# Patient Record
Sex: MALE | Race: BLACK OR AFRICAN AMERICAN | NOT HISPANIC OR LATINO | URBAN - METROPOLITAN AREA
[De-identification: names, ages, dates, MRNs, and addresses within clinical notes are randomized per-mention and may not be internally consistent; named-entity substitution may affect disease eponyms.]

---

## 2021-05-05 ENCOUNTER — EMERGENCY (EMERGENCY)
Facility: HOSPITAL | Age: 71
LOS: 1 days | Discharge: HOME | End: 2021-05-05
Attending: EMERGENCY MEDICINE | Admitting: EMERGENCY MEDICINE
Payer: SELF-PAY

## 2021-05-05 VITALS
DIASTOLIC BLOOD PRESSURE: 83 MMHG | HEART RATE: 66 BPM | OXYGEN SATURATION: 99 % | RESPIRATION RATE: 18 BRPM | SYSTOLIC BLOOD PRESSURE: 168 MMHG

## 2021-05-05 VITALS
WEIGHT: 179.9 LBS | OXYGEN SATURATION: 99 % | HEART RATE: 68 BPM | TEMPERATURE: 98 F | DIASTOLIC BLOOD PRESSURE: 91 MMHG | RESPIRATION RATE: 18 BRPM | SYSTOLIC BLOOD PRESSURE: 199 MMHG
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
## 2021-05-05 DIAGNOSIS — M25.521 PAIN IN RIGHT ELBOW: ICD-10-CM

## 2021-05-05 DIAGNOSIS — S50.01XA CONTUSION OF RIGHT ELBOW, INITIAL ENCOUNTER: ICD-10-CM
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
## 2021-05-05 PROCEDURE — 99283 EMERGENCY DEPT VISIT LOW MDM: CPT

## 2021-05-05 RX ORDER — IBUPROFEN 200 MG
600 TABLET ORAL ONCE
Refills: 0 | Status: COMPLETED | OUTPATIENT
Start: 2021-05-05 | End: 2021-05-05

## 2021-05-05 RX ORDER — ACETAMINOPHEN 500 MG
650 TABLET ORAL ONCE
Refills: 0 | Status: COMPLETED | OUTPATIENT
Start: 2021-05-05 | End: 2021-05-05

## 2021-05-05 RX ADMIN — Medication 600 MILLIGRAM(S): at 16:30

## 2021-05-05 RX ADMIN — Medication 650 MILLIGRAM(S): at 16:30
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
## 2021-05-05 NOTE — ED PROVIDER NOTE - PHYSICAL EXAMINATION
VITAL SIGNS: I have reviewed nursing notes and confirm.  CONSTITUTIONAL: Very happy, smiling, and cheering, Well-developed; well-nourished; in no acute distress.  SKIN: Skin is warm and dry, no acute rash.  HEAD: Normocephalic; atraumatic.  EYES: Pupils round bilaterally, 3mm; conjunctiva and sclera clear.  ENT: No nasal discharge; airway clear, MMM.  NECK: Supple; non tender.  MSL: Mild point tenderness to palpation to the right side over area of fat; no ecchymosis, skin is intact; elbow has full ROM, good pulses, skin intact, good strength.   EXT: Normal ROM. No clubbing, cyanosis or edema.  NEURO: Alert, oriented. Grossly unremarkable. STONE, normal tone, no gross motor or sensory changes. Fluent speech.   PSYCH: Cooperative, appropriate. Mood and affect wnl.
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
## 2021-05-05 NOTE — ED PROVIDER NOTE - NS ED ROS FT
Other than symptoms associated with present events the following is reported:  General:  No fever, no chills, no weight loss.  HEENT:  No sore throat.  Respiratory: No cough, no dyspnea, no wheeze.  MSK[+]: Right side soreness & right elbow pain  Cardiovascular:  No chest pain, no palpitations, no orthopnea.  Neurological:  No headache, no focal weakness.   Psychiatric: No emotional stress, no depression.  Derm:  No rash.  Heme:  No bruising, no bleeding.
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
## 2021-05-05 NOTE — ED PROVIDER NOTE - PATIENT PORTAL LINK FT
You can access the FollowMyHealth Patient Portal offered by [facility] by registering at the following website: [email or web address]. By joining [employer]’s FollowMyHealth portal, you will also be able to view your health information using other applications (apps) compatible with our system.

## 2021-05-05 NOTE — ED PROVIDER NOTE - NSFOLLOWUPINSTRUCTIONS_ED_ALL_ED_FT
Muscle Pain, Adult      Muscle pain, also called myalgia, is a condition in which a person has pain in one or more muscles in the body. Muscle pain may be mild, moderate, or severe. It may feel sharp, achy, or burning. In most cases, the pain lasts only a short time and goes away without treatment.    Muscle pain can result from using muscles in a new or different way or after a period of inactivity. It is normal to feel some muscle pain after starting an exercise program. Muscles that have not been used often will be sore at first.      What are the causes?  This condition is caused by using muscles in a new or different way after a period of inactivity. Other causes may include:  •Overuse or muscle strain, especially if you are not in shape. This is the most common cause of muscle pain.      •Injury or bruising.      •Infectious diseases, including diseases caused by viruses, such as the flu (influenza).      •Fibromyalgia.This is a long-term, or chronic, condition that causes muscle tenderness, tiredness (fatigue), and headache.      •Autoimmune or rheumatologic diseases. These are conditions, such as lupus, in which the body's defense system (immunesystem) attacks areas in the body.      •Certain medicines, including ACE inhibitors and statins.        What are the signs or symptoms?    The main symptom of this condition is sore or painful muscles, including during activity and when stretching. You may also have slight swelling.      How is this diagnosed?    This condition is diagnosed with a physical exam. Your health care provider will ask questions about your pain and when it began. If you have not had muscle pain for very long, your health care provider may want to wait before doing much testing. If your muscle pain has lasted a long time, tests may be done right away. In some cases, this may include tests to rule out certain conditions or illnesses.      How is this treated?    Treatment for this condition depends on the cause. Home care is often enough to relieve muscle pain. Your health care provider may also prescribe NSAIDs, such as ibuprofen.      Follow these instructions at home:    Medicines     •Take over-the-counter and prescription medicines only as told by your health care provider.      •Ask your health care provider if the medicine prescribed to you requires you to avoid driving or using machinery.        Managing pain, swelling, and discomfort                 •If directed, put ice on the painful area. To do this:  •Put ice in a plastic bag.      •Place a towel between your skin and the bag.      •Leave the ice on for 20 minutes, 2–3 times a day.      •For the first 2 days of muscle soreness, or if there is swelling:  •Do not soak in hot baths.      •Do not use a hot tub, steam room, sauna, heating pad, or other heat source.      •After 48–72 hours, you may alternate between applying ice and applying heat as told by your health care provider. If directed, apply heat to the affected area as often as told by your health care provider. Use the heat source that your health care provider recommends, such as a moist heat pack or a heating pad.  •Place a towel between your skin and the heat source.      •Leave the heat on for 20–30 minutes.      •Remove the heat if your skin turns bright red. This is especially important if you are unable to feel pain, heat, or cold. You may have a greater risk of getting burned.        •If you have an injury, raise (elevate) the injured area above the level of your heart while you are sitting or lying down.        Activity    •If overuse is causing your muscle pain:  •Slow down your activities until the pain goes away.      •Do regular, gentle exercises if you are not usually active.      •Warm up before exercising. Stretch before and after exercising. This can help lower the risk of muscle pain.        • Do not continue working out if the pain is severe. Severe pain could mean that you have injured a muscle.      • Do not lift anything that is heavier than 5–10 lb (2.3–4.5 kg), or the limit that you are told, until your health care provider says that it is safe.      •Return to your normal activities as told by your health care provider. Ask your health care provider what activities are safe for you.      General instructions     • Do not use any products that contain nicotine or tobacco, such as cigarettes, e-cigarettes, and chewing tobacco. These can delay healing. If you need help quitting, ask your health care provider.      •Keep all follow-up visits as told by your health care provider. This is important.        Contact a health care provider if you have:    •Muscle pain that gets worse and medicines do not help.      •Muscle pain that lasts longer than 3 days.      •A rash or fever along with muscle pain.      •Muscle pain after a tick bite.      •Muscle pain while working out, even though you are in good physical condition.      •Redness, soreness, or swelling along with muscle pain.      •Muscle pain after starting a new medicine or changing the dose of a medicine.        Get help right away if you have:    •Trouble breathing.      •Trouble swallowing.      •Muscle pain along with a stiff neck, fever, and vomiting.      •Severe muscle weakness or you cannot move part of your body.      These symptoms may represent a serious problem that is an emergency. Do not wait to see if the symptoms will go away. Get medical help right away. Call your local emergency services (911 in the [place] Do not drive yourself to the hospital.       Summary    •Muscle pain usually lasts only a short time and goes away without treatment.      •This condition is caused by using muscles in a new or different way after a period of inactivity.      •If your muscle pain lasts longer than 3 days, tell your health care provider.      This information is not intended to replace advice given to you by your health care provider. Make sure you discuss any questions you have with your health care provider.      Document Revised: 09/25/2020 Document Reviewed: 09/25/2020    [employer] Patient Education © 2021 [employer]

## 2021-05-05 NOTE — ED PROVIDER NOTE - DISPOSITION TYPE
DISCHARGE
Stay well hydrated.  Follow-up with your primary doctor within 1-2 days  Please follow-up with gastroenterologist if abdominal pain persists  Bring a copy of your results with you  Return to an ER for worsening symptoms or any other concerns.

## 2021-05-27 ENCOUNTER — EMERGENCY (EMERGENCY)
Facility: HOSPITAL | Age: 71
LOS: 1 days | Discharge: HOME | End: 2021-05-27
Attending: EMERGENCY MEDICINE | Admitting: EMERGENCY MEDICINE
Payer: COMMERCIAL

## 2021-05-27 VITALS
OXYGEN SATURATION: 97 % | RESPIRATION RATE: 16 BRPM | DIASTOLIC BLOOD PRESSURE: 81 MMHG | TEMPERATURE: 98 F | SYSTOLIC BLOOD PRESSURE: 168 MMHG | HEART RATE: 61 BPM

## 2021-05-27 VITALS
HEART RATE: 76 BPM | HEIGHT: 70 IN | OXYGEN SATURATION: 97 % | RESPIRATION RATE: 16 BRPM | WEIGHT: 177.03 LBS | TEMPERATURE: 99 F | DIASTOLIC BLOOD PRESSURE: 94 MMHG | SYSTOLIC BLOOD PRESSURE: 184 MMHG

## 2021-05-27 DIAGNOSIS — R31.9 HEMATURIA, UNSPECIFIED: ICD-10-CM

## 2021-05-27 LAB
ALBUMIN SERPL ELPH-MCNC: 3.8 G/DL — SIGNIFICANT CHANGE UP (ref 3.4–5)
ALP SERPL-CCNC: 95 U/L — SIGNIFICANT CHANGE UP (ref 40–120)
ALT FLD-CCNC: 24 U/L — SIGNIFICANT CHANGE UP (ref 12–42)
ANION GAP SERPL CALC-SCNC: 10 MMOL/L — SIGNIFICANT CHANGE UP (ref 9–16)
APPEARANCE UR: CLEAR — SIGNIFICANT CHANGE UP
AST SERPL-CCNC: 32 U/L — SIGNIFICANT CHANGE UP (ref 15–37)
BACTERIA # UR AUTO: PRESENT /HPF
BASOPHILS # BLD AUTO: 0 K/UL — SIGNIFICANT CHANGE UP (ref 0–0.2)
BILIRUB SERPL-MCNC: 0.9 MG/DL — SIGNIFICANT CHANGE UP (ref 0.2–1.2)
BILIRUB UR-MCNC: NEGATIVE — SIGNIFICANT CHANGE UP
BUN SERPL-MCNC: 10 MG/DL — SIGNIFICANT CHANGE UP (ref 7–23)
CALCIUM SERPL-MCNC: 9.2 MG/DL — SIGNIFICANT CHANGE UP (ref 8.5–10.5)
CHLORIDE SERPL-SCNC: 107 MMOL/L — SIGNIFICANT CHANGE UP (ref 96–108)
CO2 SERPL-SCNC: 27 MMOL/L — SIGNIFICANT CHANGE UP (ref 22–31)
COLOR SPEC: YELLOW — SIGNIFICANT CHANGE UP
CREAT SERPL-MCNC: 1 MG/DL — SIGNIFICANT CHANGE UP (ref 0.5–1.3)
DIFF PNL FLD: ABNORMAL
EOSINOPHIL # BLD AUTO: 0.06 K/UL — SIGNIFICANT CHANGE UP (ref 0–0.5)
EOSINOPHIL NFR BLD AUTO: 1.1 % — SIGNIFICANT CHANGE UP (ref 0–6)
EPI CELLS # UR: SIGNIFICANT CHANGE UP /HPF (ref 0–5)
GLUCOSE SERPL-MCNC: 101 MG/DL — HIGH (ref 70–99)
GLUCOSE UR QL: NEGATIVE — SIGNIFICANT CHANGE UP
HCT VFR BLD CALC: 39.5 % — SIGNIFICANT CHANGE UP (ref 39–50)
HGB BLD-MCNC: 12.6 G/DL — LOW (ref 13–17)
IMM GRANULOCYTES NFR BLD AUTO: 0.2 % — SIGNIFICANT CHANGE UP (ref 0–1.5)
KETONES UR-MCNC: NEGATIVE — SIGNIFICANT CHANGE UP
LEUKOCYTE ESTERASE UR-ACNC: ABNORMAL
LYMPHOCYTES # BLD AUTO: 1.34 K/UL — SIGNIFICANT CHANGE UP (ref 1–3.3)
LYMPHOCYTES # BLD AUTO: 24.9 % — SIGNIFICANT CHANGE UP (ref 13–44)
MCHC RBC-ENTMCNC: 29.2 PG — SIGNIFICANT CHANGE UP (ref 27–34)
MCHC RBC-ENTMCNC: 31.9 GM/DL — LOW (ref 32–36)
MCV RBC AUTO: 91.4 FL — SIGNIFICANT CHANGE UP (ref 80–100)
MONOCYTES # BLD AUTO: 0.57 K/UL — SIGNIFICANT CHANGE UP (ref 0–0.9)
MONOCYTES NFR BLD AUTO: 10.6 % — SIGNIFICANT CHANGE UP (ref 2–14)
NEUTROPHILS # BLD AUTO: 3.4 K/UL — SIGNIFICANT CHANGE UP (ref 1.8–7.4)
NEUTROPHILS NFR BLD AUTO: 63.2 % — SIGNIFICANT CHANGE UP (ref 43–77)
NITRITE UR-MCNC: NEGATIVE — SIGNIFICANT CHANGE UP
NRBC # BLD: 0 /100 WBCS — SIGNIFICANT CHANGE UP (ref 0–0)
PH UR: 6 — SIGNIFICANT CHANGE UP (ref 5–8)
PLATELET # BLD AUTO: 114 K/UL — LOW (ref 150–400)
POTASSIUM SERPL-MCNC: 3.8 MMOL/L — SIGNIFICANT CHANGE UP (ref 3.5–5.3)
POTASSIUM SERPL-SCNC: 3.8 MMOL/L — SIGNIFICANT CHANGE UP (ref 3.5–5.3)
PROT SERPL-MCNC: 7.3 G/DL — SIGNIFICANT CHANGE UP (ref 6.4–8.2)
PROT UR-MCNC: 100 MG/DL
RBC # BLD: 4.32 M/UL — SIGNIFICANT CHANGE UP (ref 4.2–5.8)
RBC # FLD: 11.6 % — SIGNIFICANT CHANGE UP (ref 10.3–14.5)
RBC CASTS # UR COMP ASSIST: > 10 /HPF
SODIUM SERPL-SCNC: 144 MMOL/L — SIGNIFICANT CHANGE UP (ref 132–145)
SP GR SPEC: 1.02 — SIGNIFICANT CHANGE UP (ref 1–1.03)
UROBILINOGEN FLD QL: 0.2 E.U./DL — SIGNIFICANT CHANGE UP
WBC # BLD: 5.38 K/UL — SIGNIFICANT CHANGE UP (ref 3.8–10.5)
WBC # FLD AUTO: 5.38 K/UL — SIGNIFICANT CHANGE UP (ref 3.8–10.5)
WBC UR QL: > 10 /HPF

## 2021-05-27 PROCEDURE — 74177 CT ABD & PELVIS W/CONTRAST: CPT | Mod: 26,MH

## 2021-05-27 PROCEDURE — 99285 EMERGENCY DEPT VISIT HI MDM: CPT

## 2021-05-27 RX ORDER — SODIUM CHLORIDE 9 MG/ML
1000 INJECTION INTRAMUSCULAR; INTRAVENOUS; SUBCUTANEOUS ONCE
Refills: 0 | Status: COMPLETED | OUTPATIENT
Start: 2021-05-27 | End: 2021-05-27

## 2021-05-27 RX ADMIN — SODIUM CHLORIDE 1000 MILLILITER(S): 9 INJECTION INTRAMUSCULAR; INTRAVENOUS; SUBCUTANEOUS at 21:23

## 2021-05-27 RX ADMIN — SODIUM CHLORIDE 1000 MILLILITER(S): 9 INJECTION INTRAMUSCULAR; INTRAVENOUS; SUBCUTANEOUS at 22:53

## 2021-05-27 NOTE — ED ADULT NURSE REASSESSMENT NOTE - NS ED NURSE REASSESS COMMENT FT1
Pt awaiting radiology results. Family at the bedside.
Received Pt from previous RN lying on stretcher awake and alert, breathing with ease on RA. IV site is patent. Family at the bedside. Urine specimen collected and sent. Awaiting dispo.
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
## 2021-05-28 NOTE — ED PROVIDER NOTE - CLINICAL SUMMARY MEDICAL DECISION MAKING FREE TEXT BOX
pt stable throughout ed stay   ct results dw the patient all questions answered     follow up with urology   levsharmin rx

## 2021-05-28 NOTE — ED PROVIDER NOTE - CARE PROVIDER_API CALL
James Ley)  Urology  [address] [place] [address]  Phone: [phone]  Fax: [phone]  Follow Up Time: 1-3 Days

## 2021-05-28 NOTE — ED PROVIDER NOTE - NSFOLLOWUPINSTRUCTIONS_ED_ALL_ED_FT
take medications as directed     follow up with urology this week    return to ER if blood in urine increases or for pain or fever
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
## 2021-05-28 NOTE — ED PROVIDER NOTE - PATIENT PORTAL LINK FT
You can access the FollowMyHealth Patient Portal offered by [facility] by registering at the following website: [email or web address]. By joining [employer]’s FollowMyHealth portal, you will also be able to view your health information using other applications (apps) compatible with our system.

## 2023-10-14 DIAGNOSIS — I89.0 LYMPHEDEMA: Primary | ICD-10-CM

## 2023-11-02 ENCOUNTER — APPOINTMENT (OUTPATIENT)
Dept: REHABILITATION | Age: 73
End: 2023-11-02
Attending: INTERNAL MEDICINE

## 2023-11-09 ENCOUNTER — HOSPITAL ENCOUNTER (OUTPATIENT)
Dept: REHABILITATION | Age: 73
Discharge: STILL ADMITTED OR ADMINISTRATIVE | End: 2023-11-09
Attending: INTERNAL MEDICINE

## 2023-11-09 PROCEDURE — 97165 OT EVAL LOW COMPLEX 30 MIN: CPT | Performed by: OCCUPATIONAL THERAPIST

## 2023-11-09 PROCEDURE — 97140 MANUAL THERAPY 1/> REGIONS: CPT | Performed by: OCCUPATIONAL THERAPIST

## 2023-11-13 ASSESSMENT — ENCOUNTER SYMPTOMS
SUBJECTIVE PAIN PROGRESSION: WORSENING
QUALITY: UNABLE TO SPECIFY

## 2023-11-14 ENCOUNTER — HOSPITAL ENCOUNTER (OUTPATIENT)
Dept: REHABILITATION | Age: 73
Discharge: STILL ADMITTED OR ADMINISTRATIVE | End: 2023-11-14
Attending: INTERNAL MEDICINE

## 2023-11-14 PROCEDURE — 97140 MANUAL THERAPY 1/> REGIONS: CPT | Performed by: OCCUPATIONAL THERAPIST

## 2023-11-16 ENCOUNTER — HOSPITAL ENCOUNTER (OUTPATIENT)
Dept: REHABILITATION | Age: 73
Discharge: STILL ADMITTED OR ADMINISTRATIVE | End: 2023-11-16
Attending: INTERNAL MEDICINE

## 2023-11-16 PROCEDURE — 97140 MANUAL THERAPY 1/> REGIONS: CPT | Performed by: OCCUPATIONAL THERAPIST

## 2023-11-17 ASSESSMENT — ENCOUNTER SYMPTOMS
QUALITY: UNABLE TO SPECIFY
SUBJECTIVE PAIN PROGRESSION: WORSENING

## 2023-11-21 ENCOUNTER — APPOINTMENT (OUTPATIENT)
Dept: REHABILITATION | Age: 73
End: 2023-11-21
Attending: INTERNAL MEDICINE

## 2023-11-21 PROCEDURE — 97140 MANUAL THERAPY 1/> REGIONS: CPT | Performed by: OCCUPATIONAL THERAPIST

## 2023-11-24 ENCOUNTER — APPOINTMENT (OUTPATIENT)
Dept: REHABILITATION | Age: 73
End: 2023-11-24
Attending: INTERNAL MEDICINE

## 2023-12-04 ENCOUNTER — APPOINTMENT (OUTPATIENT)
Dept: REHABILITATION | Age: 73
End: 2023-12-04
Attending: INTERNAL MEDICINE

## 2023-12-07 ENCOUNTER — HOSPITAL ENCOUNTER (OUTPATIENT)
Dept: REHABILITATION | Age: 73
Discharge: STILL ADMITTED OR ADMINISTRATIVE | End: 2023-12-07
Attending: INTERNAL MEDICINE

## 2023-12-07 PROCEDURE — 97140 MANUAL THERAPY 1/> REGIONS: CPT | Performed by: OCCUPATIONAL THERAPIST

## 2023-12-12 ENCOUNTER — HOSPITAL ENCOUNTER (OUTPATIENT)
Dept: REHABILITATION | Age: 73
Discharge: STILL ADMITTED OR ADMINISTRATIVE | End: 2023-12-12
Attending: INTERNAL MEDICINE

## 2023-12-12 PROCEDURE — 97140 MANUAL THERAPY 1/> REGIONS: CPT | Performed by: OCCUPATIONAL THERAPIST

## 2023-12-14 ENCOUNTER — HOSPITAL ENCOUNTER (OUTPATIENT)
Dept: REHABILITATION | Age: 73
Discharge: STILL ADMITTED OR ADMINISTRATIVE | End: 2023-12-14
Attending: INTERNAL MEDICINE

## 2023-12-14 PROCEDURE — 97140 MANUAL THERAPY 1/> REGIONS: CPT | Performed by: OCCUPATIONAL THERAPIST

## 2023-12-18 ENCOUNTER — HOSPITAL ENCOUNTER (OUTPATIENT)
Dept: REHABILITATION | Age: 73
Discharge: STILL ADMITTED OR ADMINISTRATIVE | End: 2023-12-18
Attending: INTERNAL MEDICINE

## 2023-12-18 PROCEDURE — 97140 MANUAL THERAPY 1/> REGIONS: CPT | Performed by: OCCUPATIONAL THERAPIST

## 2023-12-21 ENCOUNTER — HOSPITAL ENCOUNTER (OUTPATIENT)
Dept: REHABILITATION | Age: 73
Discharge: STILL ADMITTED OR ADMINISTRATIVE | End: 2023-12-21
Attending: INTERNAL MEDICINE

## 2023-12-21 PROCEDURE — 97140 MANUAL THERAPY 1/> REGIONS: CPT | Performed by: OCCUPATIONAL THERAPIST

## 2023-12-28 ENCOUNTER — HOSPITAL ENCOUNTER (OUTPATIENT)
Dept: REHABILITATION | Age: 73
Discharge: STILL ADMITTED OR ADMINISTRATIVE | End: 2023-12-28
Attending: INTERNAL MEDICINE

## 2023-12-28 PROCEDURE — 97140 MANUAL THERAPY 1/> REGIONS: CPT | Performed by: OCCUPATIONAL THERAPIST

## 2024-01-02 ENCOUNTER — HOSPITAL ENCOUNTER (OUTPATIENT)
Dept: REHABILITATION | Age: 74
Discharge: STILL ADMITTED OR ADMINISTRATIVE | End: 2024-01-02
Attending: INTERNAL MEDICINE

## 2024-01-02 PROCEDURE — 97140 MANUAL THERAPY 1/> REGIONS: CPT | Performed by: OCCUPATIONAL THERAPIST

## 2024-01-04 ENCOUNTER — APPOINTMENT (OUTPATIENT)
Dept: REHABILITATION | Age: 74
End: 2024-01-04
Attending: INTERNAL MEDICINE

## 2024-01-08 ENCOUNTER — APPOINTMENT (OUTPATIENT)
Dept: REHABILITATION | Age: 74
End: 2024-01-08
Attending: INTERNAL MEDICINE

## 2024-01-10 ENCOUNTER — APPOINTMENT (OUTPATIENT)
Dept: REHABILITATION | Age: 74
End: 2024-01-10
Attending: INTERNAL MEDICINE

## 2024-01-11 ENCOUNTER — APPOINTMENT (OUTPATIENT)
Dept: REHABILITATION | Age: 74
End: 2024-01-11
Attending: INTERNAL MEDICINE

## 2024-01-11 DIAGNOSIS — R26.89 BALANCE PROBLEMS: Primary | ICD-10-CM

## 2024-01-12 ENCOUNTER — APPOINTMENT (OUTPATIENT)
Dept: REHABILITATION | Age: 74
End: 2024-01-12
Attending: INTERNAL MEDICINE

## 2024-01-16 ENCOUNTER — HOSPITAL ENCOUNTER (OUTPATIENT)
Dept: REHABILITATION | Age: 74
Discharge: STILL ADMITTED OR ADMINISTRATIVE | End: 2024-01-16
Attending: INTERNAL MEDICINE

## 2024-01-16 PROCEDURE — 97140 MANUAL THERAPY 1/> REGIONS: CPT | Performed by: OCCUPATIONAL THERAPIST

## 2024-01-22 ENCOUNTER — HOSPITAL ENCOUNTER (OUTPATIENT)
Dept: REHABILITATION | Age: 74
Discharge: STILL ADMITTED OR ADMINISTRATIVE | End: 2024-01-22
Attending: INTERNAL MEDICINE

## 2024-01-22 PROCEDURE — 97140 MANUAL THERAPY 1/> REGIONS: CPT | Performed by: OCCUPATIONAL THERAPIST

## 2024-01-25 ENCOUNTER — APPOINTMENT (OUTPATIENT)
Dept: REHABILITATION | Age: 74
End: 2024-01-25
Attending: INTERNAL MEDICINE

## 2024-01-26 ENCOUNTER — APPOINTMENT (OUTPATIENT)
Dept: REHABILITATION | Age: 74
End: 2024-01-26
Attending: INTERNAL MEDICINE

## 2024-01-31 ENCOUNTER — HOSPITAL ENCOUNTER (OUTPATIENT)
Dept: REHABILITATION | Age: 74
Discharge: STILL ADMITTED OR ADMINISTRATIVE | End: 2024-01-31
Attending: INTERNAL MEDICINE

## 2024-01-31 ENCOUNTER — APPOINTMENT (OUTPATIENT)
Dept: REHABILITATION | Age: 74
End: 2024-01-31
Attending: INTERNAL MEDICINE

## 2024-01-31 PROCEDURE — 97140 MANUAL THERAPY 1/> REGIONS: CPT | Performed by: OCCUPATIONAL THERAPIST

## 2024-02-07 ENCOUNTER — APPOINTMENT (OUTPATIENT)
Dept: REHABILITATION | Age: 74
End: 2024-02-07
Attending: INTERNAL MEDICINE

## 2024-02-14 ENCOUNTER — HOSPITAL ENCOUNTER (OUTPATIENT)
Dept: REHABILITATION | Age: 74
Discharge: STILL ADMITTED OR ADMINISTRATIVE | End: 2024-02-14
Attending: INTERNAL MEDICINE

## 2024-02-14 PROCEDURE — 97140 MANUAL THERAPY 1/> REGIONS: CPT | Performed by: OCCUPATIONAL THERAPIST

## 2024-02-21 ENCOUNTER — HOSPITAL ENCOUNTER (OUTPATIENT)
Dept: REHABILITATION | Age: 74
Discharge: STILL ADMITTED OR ADMINISTRATIVE | End: 2024-02-21
Attending: INTERNAL MEDICINE

## 2024-02-21 PROCEDURE — 97140 MANUAL THERAPY 1/> REGIONS: CPT | Performed by: OCCUPATIONAL THERAPIST

## 2024-02-28 ENCOUNTER — HOSPITAL ENCOUNTER (OUTPATIENT)
Dept: REHABILITATION | Age: 74
Discharge: STILL ADMITTED OR ADMINISTRATIVE | End: 2024-02-28
Attending: INTERNAL MEDICINE

## 2024-02-28 PROCEDURE — 97140 MANUAL THERAPY 1/> REGIONS: CPT | Performed by: OCCUPATIONAL THERAPIST

## 2024-03-07 ENCOUNTER — HOSPITAL ENCOUNTER (OUTPATIENT)
Dept: REHABILITATION | Age: 74
Discharge: STILL ADMITTED OR ADMINISTRATIVE | End: 2024-03-07
Attending: INTERNAL MEDICINE

## 2024-03-07 PROCEDURE — 97140 MANUAL THERAPY 1/> REGIONS: CPT | Performed by: OCCUPATIONAL THERAPIST

## 2024-03-13 ENCOUNTER — HOSPITAL ENCOUNTER (OUTPATIENT)
Dept: REHABILITATION | Age: 74
Discharge: STILL ADMITTED OR ADMINISTRATIVE | End: 2024-03-13
Attending: INTERNAL MEDICINE

## 2024-03-13 PROCEDURE — 97140 MANUAL THERAPY 1/> REGIONS: CPT | Performed by: OCCUPATIONAL THERAPIST

## 2024-03-18 ENCOUNTER — HOSPITAL ENCOUNTER (OUTPATIENT)
Dept: REHABILITATION | Age: 74
Discharge: STILL ADMITTED OR ADMINISTRATIVE | End: 2024-03-18
Attending: INTERNAL MEDICINE

## 2024-03-18 PROCEDURE — 97530 THERAPEUTIC ACTIVITIES: CPT | Performed by: OCCUPATIONAL THERAPIST

## 2024-03-18 PROCEDURE — 97140 MANUAL THERAPY 1/> REGIONS: CPT | Performed by: OCCUPATIONAL THERAPIST

## 2024-03-20 ENCOUNTER — APPOINTMENT (OUTPATIENT)
Dept: REHABILITATION | Age: 74
End: 2024-03-20
Attending: INTERNAL MEDICINE

## 2024-03-29 DIAGNOSIS — I89.0 LYMPHEDEMA: ICD-10-CM

## 2024-03-29 DIAGNOSIS — I89.0 LYMPHEDEMA: Primary | ICD-10-CM

## 2024-03-29 DIAGNOSIS — Q28.2: Primary | ICD-10-CM

## 2024-03-29 DIAGNOSIS — Q87.89: Primary | ICD-10-CM

## 2024-03-29 DIAGNOSIS — Q82.0: Primary | ICD-10-CM

## 2024-04-03 PROCEDURE — 82365 CALCULUS SPECTROSCOPY: CPT | Performed by: CLINICAL MEDICAL LABORATORY

## 2024-04-04 ENCOUNTER — LAB REQUISITION (OUTPATIENT)
Dept: LAB | Age: 74
End: 2024-04-04

## 2024-04-04 DIAGNOSIS — N20.0 CALCULUS OF KIDNEY: ICD-10-CM

## 2024-04-07 LAB
APPEARANCE STONE: NORMAL
COMPN STONE: NORMAL
SPECIMEN WT: 84 MG

## 2024-04-18 ENCOUNTER — APPOINTMENT (OUTPATIENT)
Dept: REHABILITATION | Age: 74
End: 2024-04-18
Attending: INTERNAL MEDICINE

## 2024-04-25 ENCOUNTER — HOSPITAL ENCOUNTER (OUTPATIENT)
Dept: REHABILITATION | Age: 74
Discharge: STILL ADMITTED OR ADMINISTRATIVE | End: 2024-04-25
Attending: INTERNAL MEDICINE

## 2024-04-25 PROCEDURE — 97165 OT EVAL LOW COMPLEX 30 MIN: CPT | Performed by: OCCUPATIONAL THERAPIST

## 2024-04-25 PROCEDURE — 97140 MANUAL THERAPY 1/> REGIONS: CPT | Performed by: OCCUPATIONAL THERAPIST

## 2024-04-25 ASSESSMENT — ENCOUNTER SYMPTOMS: SUBJECTIVE PAIN PROGRESSION: IMPROVED

## 2024-05-31 ENCOUNTER — APPOINTMENT (OUTPATIENT)
Dept: REHABILITATION | Age: 74
End: 2024-05-31
Attending: INTERNAL MEDICINE

## 2024-06-27 ENCOUNTER — HOSPITAL ENCOUNTER (OUTPATIENT)
Dept: REHABILITATION | Age: 74
Discharge: STILL ADMITTED OR ADMINISTRATIVE | End: 2024-06-27
Attending: INTERNAL MEDICINE

## 2024-06-27 PROCEDURE — 97140 MANUAL THERAPY 1/> REGIONS: CPT | Performed by: OCCUPATIONAL THERAPIST

## 2024-06-27 ASSESSMENT — ENCOUNTER SYMPTOMS: SUBJECTIVE PAIN PROGRESSION: IMPROVED

## 2024-07-09 ENCOUNTER — TELEPHONE (OUTPATIENT)
Dept: REHABILITATION | Age: 74
End: 2024-07-09

## 2024-07-25 ENCOUNTER — HOSPITAL ENCOUNTER (OUTPATIENT)
Dept: REHABILITATION | Age: 74
Discharge: STILL ADMITTED OR ADMINISTRATIVE | End: 2024-07-25
Attending: INTERNAL MEDICINE

## 2024-07-25 PROCEDURE — 97140 MANUAL THERAPY 1/> REGIONS: CPT | Performed by: OCCUPATIONAL THERAPIST

## 2024-08-14 ENCOUNTER — HOSPITAL ENCOUNTER (OUTPATIENT)
Dept: REHABILITATION | Age: 74
Discharge: STILL ADMITTED OR ADMINISTRATIVE | End: 2024-08-14
Attending: INTERNAL MEDICINE

## 2024-08-14 PROCEDURE — 97140 MANUAL THERAPY 1/> REGIONS: CPT | Performed by: OCCUPATIONAL THERAPIST

## 2024-08-21 ENCOUNTER — HOSPITAL ENCOUNTER (OUTPATIENT)
Dept: REHABILITATION | Age: 74
Discharge: STILL ADMITTED OR ADMINISTRATIVE | End: 2024-08-21
Attending: INTERNAL MEDICINE

## 2024-08-21 PROCEDURE — 97140 MANUAL THERAPY 1/> REGIONS: CPT | Performed by: OCCUPATIONAL THERAPIST

## 2024-08-28 ENCOUNTER — HOSPITAL ENCOUNTER (OUTPATIENT)
Dept: REHABILITATION | Age: 74
Discharge: STILL ADMITTED OR ADMINISTRATIVE | End: 2024-08-28
Attending: INTERNAL MEDICINE

## 2024-08-28 PROCEDURE — 97140 MANUAL THERAPY 1/> REGIONS: CPT | Performed by: OCCUPATIONAL THERAPIST

## 2024-08-29 ENCOUNTER — APPOINTMENT (OUTPATIENT)
Dept: REHABILITATION | Age: 74
End: 2024-08-29
Attending: INTERNAL MEDICINE

## 2024-09-04 ENCOUNTER — HOSPITAL ENCOUNTER (OUTPATIENT)
Dept: REHABILITATION | Age: 74
Discharge: STILL ADMITTED OR ADMINISTRATIVE | End: 2024-09-04
Attending: INTERNAL MEDICINE

## 2024-09-04 PROCEDURE — 97140 MANUAL THERAPY 1/> REGIONS: CPT | Performed by: OCCUPATIONAL THERAPIST

## 2024-09-06 ENCOUNTER — HOSPITAL ENCOUNTER (OUTPATIENT)
Dept: GASTROENTEROLOGY | Age: 74
Discharge: HOME | End: 2024-09-06
Attending: COLON & RECTAL SURGERY

## 2024-09-06 VITALS — WEIGHT: 260 LBS | HEIGHT: 70 IN | BODY MASS INDEX: 37.22 KG/M2

## 2024-09-06 DIAGNOSIS — R15.9 INCONTINENCE OF FECES, UNSPECIFIED FECAL INCONTINENCE TYPE: ICD-10-CM

## 2024-09-06 PROCEDURE — 13000024 HB GI COMPLEX CASE S/U + 1ST 15 MIN

## 2024-09-06 PROCEDURE — 13000025 HB GI COMPLEX CASE EACH ADD MINUTE

## 2024-09-11 ENCOUNTER — APPOINTMENT (OUTPATIENT)
Dept: REHABILITATION | Age: 74
End: 2024-09-11
Attending: INTERNAL MEDICINE

## 2024-10-16 ENCOUNTER — HOSPITAL ENCOUNTER (OUTPATIENT)
Dept: REHABILITATION | Age: 74
Discharge: STILL ADMITTED OR ADMINISTRATIVE | End: 2024-10-16
Attending: INTERNAL MEDICINE

## 2024-10-16 PROCEDURE — 97140 MANUAL THERAPY 1/> REGIONS: CPT | Performed by: OCCUPATIONAL THERAPIST

## 2024-11-06 ENCOUNTER — APPOINTMENT (OUTPATIENT)
Dept: REHABILITATION | Age: 74
End: 2024-11-06
Attending: INTERNAL MEDICINE

## 2024-11-13 ENCOUNTER — APPOINTMENT (OUTPATIENT)
Dept: REHABILITATION | Age: 74
End: 2024-11-13
Attending: INTERNAL MEDICINE

## 2024-11-20 ENCOUNTER — APPOINTMENT (OUTPATIENT)
Dept: REHABILITATION | Age: 74
End: 2024-11-20
Attending: INTERNAL MEDICINE

## 2024-11-27 ENCOUNTER — APPOINTMENT (OUTPATIENT)
Dept: REHABILITATION | Age: 74
End: 2024-11-27
Attending: INTERNAL MEDICINE

## 2024-12-11 ENCOUNTER — HOSPITAL ENCOUNTER (OUTPATIENT)
Dept: REHABILITATION | Age: 74
Discharge: STILL ADMITTED OR ADMINISTRATIVE | End: 2024-12-11
Attending: INTERNAL MEDICINE

## 2024-12-11 PROCEDURE — 97140 MANUAL THERAPY 1/> REGIONS: CPT | Performed by: OCCUPATIONAL THERAPIST

## 2024-12-19 ENCOUNTER — HOSPITAL ENCOUNTER (OUTPATIENT)
Dept: REHABILITATION | Age: 74
Discharge: STILL ADMITTED OR ADMINISTRATIVE | End: 2024-12-19

## 2024-12-19 PROCEDURE — 97140 MANUAL THERAPY 1/> REGIONS: CPT | Performed by: OCCUPATIONAL THERAPIST

## 2025-01-04 DIAGNOSIS — I89.0 LYMPHEDEMA: Primary | ICD-10-CM

## 2025-01-27 NOTE — ED ADULT TRIAGE NOTE - WEIGHT METHOD
January 27, 2025         Patient: Jose J Fitzpatrick   YOB: 2008   Date of Visit: 1/27/2025           To Whom it May Concern:    Jose J Fitzpatrick was seen in my clinic on 1/27/2025. He may return to school on 1/29/25.    If you have any questions or concerns, please don't hesitate to call.        Sincerely,           JOSE Bassett.  Electronically Signed      stated